# Patient Record
Sex: FEMALE | Race: WHITE | NOT HISPANIC OR LATINO | Employment: OTHER | URBAN - METROPOLITAN AREA
[De-identification: names, ages, dates, MRNs, and addresses within clinical notes are randomized per-mention and may not be internally consistent; named-entity substitution may affect disease eponyms.]

---

## 2017-03-22 PROBLEM — E78.5 HYPERLIPIDEMIA ASSOCIATED WITH TYPE 2 DIABETES MELLITUS: Status: ACTIVE | Noted: 2017-03-22

## 2017-03-22 PROBLEM — E11.69 HYPERLIPIDEMIA ASSOCIATED WITH TYPE 2 DIABETES MELLITUS: Status: ACTIVE | Noted: 2017-03-22

## 2017-03-22 PROBLEM — I15.2 HYPERTENSION ASSOCIATED WITH DIABETES: Status: ACTIVE | Noted: 2017-03-22

## 2017-03-22 PROBLEM — E03.9 HYPOTHYROIDISM: Status: ACTIVE | Noted: 2017-03-22

## 2017-03-22 PROBLEM — E11.59 HYPERTENSION ASSOCIATED WITH DIABETES: Status: ACTIVE | Noted: 2017-03-22

## 2017-03-22 PROBLEM — E53.8 B12 DEFICIENCY: Status: ACTIVE | Noted: 2017-03-22

## 2017-03-22 PROBLEM — E11.42 TYPE 2 DIABETES MELLITUS WITH DIABETIC POLYNEUROPATHY: Status: ACTIVE | Noted: 2017-03-22

## 2017-09-18 ENCOUNTER — TELEPHONE (OUTPATIENT)
Dept: RADIOLOGY | Facility: HOSPITAL | Age: 77
End: 2017-09-18

## 2017-09-19 ENCOUNTER — APPOINTMENT (OUTPATIENT)
Dept: RADIOLOGY | Facility: CLINIC | Age: 77
End: 2017-09-19
Attending: FAMILY MEDICINE
Payer: COMMERCIAL

## 2017-09-19 DIAGNOSIS — M81.0 POST-MENOPAUSAL OSTEOPOROSIS: ICD-10-CM

## 2017-09-19 PROCEDURE — 77080 DXA BONE DENSITY AXIAL: CPT | Mod: TC

## 2017-09-19 PROCEDURE — 77080 DXA BONE DENSITY AXIAL: CPT | Mod: 26,,, | Performed by: RADIOLOGY

## 2018-04-24 PROBLEM — M10.9 ACUTE GOUT: Status: ACTIVE | Noted: 2018-04-24

## 2018-06-04 PROBLEM — M51.36 DDD (DEGENERATIVE DISC DISEASE), LUMBAR: Status: ACTIVE | Noted: 2018-06-04

## 2018-06-04 PROBLEM — M51.369 DDD (DEGENERATIVE DISC DISEASE), LUMBAR: Status: ACTIVE | Noted: 2018-06-04

## 2018-06-04 PROBLEM — H35.3132 INTERMEDIATE STAGE NONEXUDATIVE AGE-RELATED MACULAR DEGENERATION OF BOTH EYES: Status: ACTIVE | Noted: 2018-06-04

## 2019-06-18 PROBLEM — Z88.8 ALLERGY TO STATIN MEDICATION: Status: ACTIVE | Noted: 2019-06-18

## 2021-08-05 ENCOUNTER — HOSPITAL ENCOUNTER (OUTPATIENT)
Dept: CARDIOLOGY | Facility: HOSPITAL | Age: 81
Discharge: HOME | End: 2021-08-05
Attending: FAMILY MEDICINE
Payer: COMMERCIAL

## 2021-08-05 ENCOUNTER — APPOINTMENT (OUTPATIENT)
Dept: RADIOLOGY | Facility: HOSPITAL | Age: 81
End: 2021-08-05
Attending: FAMILY MEDICINE
Payer: COMMERCIAL

## 2021-08-05 VITALS
HEIGHT: 63 IN | BODY MASS INDEX: 36.32 KG/M2 | WEIGHT: 205 LBS | SYSTOLIC BLOOD PRESSURE: 167 MMHG | DIASTOLIC BLOOD PRESSURE: 81 MMHG | HEART RATE: 78 BPM

## 2021-08-05 DIAGNOSIS — R01.1 HEART MURMUR: ICD-10-CM

## 2021-08-05 DIAGNOSIS — Z78.0 ASYMPTOMATIC MENOPAUSAL STATE: ICD-10-CM

## 2021-08-05 LAB
ASCENDING AORTA: 3.15 CM
AV INDEX (PROSTH): 0.58
AV MEAN GRADIENT: 11 MMHG
AV PEAK GRADIENT: 20 MMHG
AV VALVE AREA: 1.65 CM2
AV VELOCITY RATIO: 0.59
BSA FOR ECHO PROCEDURE: 2.03 M2
CV ECHO LV RWT: 0.52 CM
DOP CALC AO PEAK VEL: 2.25 M/S
DOP CALC AO VTI: 51.01 CM
DOP CALC LVOT AREA: 2.8 CM2
DOP CALC LVOT DIAMETER: 1.9 CM
DOP CALC LVOT PEAK VEL: 1.32 M/S
DOP CALC LVOT STROKE VOLUME: 83.94 CM3
DOP CALC RVOT PEAK VEL: 0.72 M/S
DOP CALC RVOT VTI: 15.54 CM
DOP CALCLVOT PEAK VEL VTI: 29.62 CM
E WAVE DECELERATION TIME: 234.27 MSEC
E/A RATIO: 0.74
E/E' RATIO: 7.91 M/S
ECHO LV POSTERIOR WALL: 1.1 CM (ref 0.6–1.1)
EJECTION FRACTION: 60 %
FRACTIONAL SHORTENING: 37 % (ref 28–44)
INTERVENTRICULAR SEPTUM: 1.13 CM (ref 0.6–1.1)
IVRT: 97.05 MSEC
LA MAJOR: 4.59 CM
LA MINOR: 4.81 CM
LA WIDTH: 3.12 CM
LEFT ATRIUM SIZE: 3.26 CM
LEFT ATRIUM VOLUME INDEX: 20.8 ML/M2
LEFT ATRIUM VOLUME: 40.61 CM3
LEFT INTERNAL DIMENSION IN SYSTOLE: 2.66 CM (ref 2.1–4)
LEFT VENTRICLE DIASTOLIC VOLUME INDEX: 40.28 ML/M2
LEFT VENTRICLE DIASTOLIC VOLUME: 78.55 ML
LEFT VENTRICLE MASS INDEX: 82 G/M2
LEFT VENTRICLE SYSTOLIC VOLUME INDEX: 13.3 ML/M2
LEFT VENTRICLE SYSTOLIC VOLUME: 25.94 ML
LEFT VENTRICULAR INTERNAL DIMENSION IN DIASTOLE: 4.2 CM (ref 3.5–6)
LEFT VENTRICULAR MASS: 160.14 G
LV LATERAL E/E' RATIO: 8.7 M/S
LV SEPTAL E/E' RATIO: 7.25 M/S
MV A" WAVE DURATION": 10.56 MSEC
MV PEAK A VEL: 1.17 M/S
MV PEAK E VEL: 0.87 M/S
MV STENOSIS PRESSURE HALF TIME: 67.94 MS
MV VALVE AREA P 1/2 METHOD: 3.24 CM2
PISA TR MAX VEL: 2.36 M/S
PULM VEIN S/D RATIO: 1.47
PV MEAN GRADIENT: 1 MMHG
PV PEAK D VEL: 0.43 M/S
PV PEAK S VEL: 0.63 M/S
PV PEAK VELOCITY: 1.01 CM/S
RA MAJOR: 5.19 CM
RA PRESSURE: 3 MMHG
RA WIDTH: 2.75 CM
RIGHT VENTRICULAR END-DIASTOLIC DIMENSION: 2.64 CM
SINUS: 2.23 CM
STJ: 1.71 CM
TDI LATERAL: 0.1 M/S
TDI SEPTAL: 0.12 M/S
TDI: 0.11 M/S
TR MAX PG: 22 MMHG
TV REST PULMONARY ARTERY PRESSURE: 25 MMHG

## 2021-08-05 PROCEDURE — 77080 DXA BONE DENSITY AXIAL: CPT | Mod: 26,,, | Performed by: RADIOLOGY

## 2021-08-05 PROCEDURE — 93306 ECHO (CUPID ONLY): ICD-10-PCS | Mod: 26,,, | Performed by: INTERNAL MEDICINE

## 2021-08-05 PROCEDURE — 93306 TTE W/DOPPLER COMPLETE: CPT

## 2021-08-05 PROCEDURE — 93306 TTE W/DOPPLER COMPLETE: CPT | Mod: 26,,, | Performed by: INTERNAL MEDICINE

## 2021-08-05 PROCEDURE — 77080 DEXA BONE DENSITY SPINE HIP: ICD-10-PCS | Mod: 26,,, | Performed by: RADIOLOGY

## 2021-08-05 PROCEDURE — 77080 DXA BONE DENSITY AXIAL: CPT | Mod: TC

## 2023-07-11 ENCOUNTER — OFFICE VISIT (OUTPATIENT)
Dept: OPHTHALMOLOGY | Facility: CLINIC | Age: 83
End: 2023-07-11
Payer: MEDICARE

## 2023-07-11 ENCOUNTER — HOSPITAL ENCOUNTER (OUTPATIENT)
Dept: CARDIOLOGY | Facility: HOSPITAL | Age: 83
Discharge: HOME | End: 2023-07-11
Attending: INTERNAL MEDICINE
Payer: MEDICARE

## 2023-07-11 ENCOUNTER — TELEPHONE (OUTPATIENT)
Dept: CARDIOLOGY | Facility: CLINIC | Age: 83
End: 2023-07-11
Payer: MEDICARE

## 2023-07-11 ENCOUNTER — OFFICE VISIT (OUTPATIENT)
Dept: CARDIOLOGY | Facility: CLINIC | Age: 83
End: 2023-07-11
Payer: MEDICARE

## 2023-07-11 VITALS
OXYGEN SATURATION: 96 % | BODY MASS INDEX: 34.2 KG/M2 | WEIGHT: 193 LBS | DIASTOLIC BLOOD PRESSURE: 74 MMHG | HEART RATE: 64 BPM | HEIGHT: 63 IN | SYSTOLIC BLOOD PRESSURE: 122 MMHG

## 2023-07-11 DIAGNOSIS — E11.59 HYPERTENSION ASSOCIATED WITH DIABETES: ICD-10-CM

## 2023-07-11 DIAGNOSIS — E11.42 TYPE 2 DIABETES MELLITUS WITH DIABETIC POLYNEUROPATHY, WITHOUT LONG-TERM CURRENT USE OF INSULIN: ICD-10-CM

## 2023-07-11 DIAGNOSIS — I35.0 NONRHEUMATIC AORTIC VALVE STENOSIS: ICD-10-CM

## 2023-07-11 DIAGNOSIS — E78.5 HYPERLIPIDEMIA ASSOCIATED WITH TYPE 2 DIABETES MELLITUS: ICD-10-CM

## 2023-07-11 DIAGNOSIS — I15.2 HYPERTENSION ASSOCIATED WITH DIABETES: ICD-10-CM

## 2023-07-11 DIAGNOSIS — E11.9 CONTROLLED TYPE 2 DIABETES MELLITUS WITHOUT COMPLICATION, WITHOUT LONG-TERM CURRENT USE OF INSULIN: Primary | ICD-10-CM

## 2023-07-11 DIAGNOSIS — I15.2 HYPERTENSION ASSOCIATED WITH DIABETES: Primary | ICD-10-CM

## 2023-07-11 DIAGNOSIS — R94.31 ABNORMAL ECG: ICD-10-CM

## 2023-07-11 DIAGNOSIS — Z72.0 TOBACCO ABUSE: ICD-10-CM

## 2023-07-11 DIAGNOSIS — Z88.8 ALLERGY TO STATIN MEDICATION: ICD-10-CM

## 2023-07-11 DIAGNOSIS — I51.89 DIASTOLIC DYSFUNCTION: ICD-10-CM

## 2023-07-11 DIAGNOSIS — E66.09 CLASS 1 OBESITY DUE TO EXCESS CALORIES WITH SERIOUS COMORBIDITY AND BODY MASS INDEX (BMI) OF 34.0 TO 34.9 IN ADULT: ICD-10-CM

## 2023-07-11 DIAGNOSIS — Z98.890 HISTORY OF REPAIR OF RETINAL TEAR BY LASER PHOTOCOAGULATION: ICD-10-CM

## 2023-07-11 DIAGNOSIS — Z96.1 PSEUDOPHAKIA: ICD-10-CM

## 2023-07-11 DIAGNOSIS — E11.59 HYPERTENSION ASSOCIATED WITH DIABETES: Primary | ICD-10-CM

## 2023-07-11 DIAGNOSIS — E11.69 HYPERLIPIDEMIA ASSOCIATED WITH TYPE 2 DIABETES MELLITUS: ICD-10-CM

## 2023-07-11 DIAGNOSIS — H35.3132 INTERMEDIATE STAGE NONEXUDATIVE AGE-RELATED MACULAR DEGENERATION OF BOTH EYES: ICD-10-CM

## 2023-07-11 PROBLEM — E66.811 CLASS 1 OBESITY DUE TO EXCESS CALORIES WITH SERIOUS COMORBIDITY AND BODY MASS INDEX (BMI) OF 34.0 TO 34.9 IN ADULT: Status: ACTIVE | Noted: 2023-07-11

## 2023-07-11 PROCEDURE — 99999 PR PBB SHADOW E&M-EST. PATIENT-LVL II: ICD-10-PCS | Mod: PBBFAC,HCWC,, | Performed by: OPHTHALMOLOGY

## 2023-07-11 PROCEDURE — 99204 OFFICE O/P NEW MOD 45 MIN: CPT | Mod: HCWC,S$GLB,, | Performed by: INTERNAL MEDICINE

## 2023-07-11 PROCEDURE — 99999 PR PBB SHADOW E&M-EST. PATIENT-LVL III: CPT | Mod: PBBFAC,HCWC,, | Performed by: INTERNAL MEDICINE

## 2023-07-11 PROCEDURE — 93010 EKG 12-LEAD: ICD-10-PCS | Mod: HCWC,,, | Performed by: INTERNAL MEDICINE

## 2023-07-11 PROCEDURE — 92134 CPTRZ OPH DX IMG PST SGM RTA: CPT | Mod: HCWC,S$GLB,, | Performed by: OPHTHALMOLOGY

## 2023-07-11 PROCEDURE — 1160F PR REVIEW ALL MEDS BY PRESCRIBER/CLIN PHARMACIST DOCUMENTED: ICD-10-PCS | Mod: HCWC,CPTII,S$GLB, | Performed by: INTERNAL MEDICINE

## 2023-07-11 PROCEDURE — 1159F MED LIST DOCD IN RCRD: CPT | Mod: HCWC,CPTII,S$GLB, | Performed by: INTERNAL MEDICINE

## 2023-07-11 PROCEDURE — 3078F PR MOST RECENT DIASTOLIC BLOOD PRESSURE < 80 MM HG: ICD-10-PCS | Mod: HCWC,CPTII,S$GLB, | Performed by: INTERNAL MEDICINE

## 2023-07-11 PROCEDURE — 1159F PR MEDICATION LIST DOCUMENTED IN MEDICAL RECORD: ICD-10-PCS | Mod: HCWC,CPTII,S$GLB, | Performed by: INTERNAL MEDICINE

## 2023-07-11 PROCEDURE — 3078F DIAST BP <80 MM HG: CPT | Mod: HCWC,CPTII,S$GLB, | Performed by: INTERNAL MEDICINE

## 2023-07-11 PROCEDURE — 92004 COMPRE OPH EXAM NEW PT 1/>: CPT | Mod: HCWC,S$GLB,, | Performed by: OPHTHALMOLOGY

## 2023-07-11 PROCEDURE — 3074F SYST BP LT 130 MM HG: CPT | Mod: HCWC,CPTII,S$GLB, | Performed by: INTERNAL MEDICINE

## 2023-07-11 PROCEDURE — 93010 ELECTROCARDIOGRAM REPORT: CPT | Mod: HCWC,,, | Performed by: INTERNAL MEDICINE

## 2023-07-11 PROCEDURE — 3074F PR MOST RECENT SYSTOLIC BLOOD PRESSURE < 130 MM HG: ICD-10-PCS | Mod: HCWC,CPTII,S$GLB, | Performed by: INTERNAL MEDICINE

## 2023-07-11 PROCEDURE — 99204 PR OFFICE/OUTPT VISIT, NEW, LEVL IV, 45-59 MIN: ICD-10-PCS | Mod: HCWC,S$GLB,, | Performed by: INTERNAL MEDICINE

## 2023-07-11 PROCEDURE — 99999 PR PBB SHADOW E&M-EST. PATIENT-LVL II: CPT | Mod: PBBFAC,HCWC,, | Performed by: OPHTHALMOLOGY

## 2023-07-11 PROCEDURE — 93005 ELECTROCARDIOGRAM TRACING: CPT | Mod: HCWC

## 2023-07-11 PROCEDURE — 99999 PR PBB SHADOW E&M-EST. PATIENT-LVL III: ICD-10-PCS | Mod: PBBFAC,HCWC,, | Performed by: INTERNAL MEDICINE

## 2023-07-11 PROCEDURE — 92004 PR EYE EXAM, NEW PATIENT,COMPREHESV: ICD-10-PCS | Mod: HCWC,S$GLB,, | Performed by: OPHTHALMOLOGY

## 2023-07-11 PROCEDURE — 92134 POSTERIOR SEGMENT OCT RETINA (OCULAR COHERENCE TOMOGRAPHY)-BOTH EYES: ICD-10-PCS | Mod: HCWC,S$GLB,, | Performed by: OPHTHALMOLOGY

## 2023-07-11 PROCEDURE — 1160F RVW MEDS BY RX/DR IN RCRD: CPT | Mod: HCWC,CPTII,S$GLB, | Performed by: INTERNAL MEDICINE

## 2023-07-11 NOTE — PROGRESS NOTES
===============================  Date today is 7/11/2023  Mandie Wu is a 82 y.o. female  Last visit [facility]: :Visit date not found   Last visit eye dept. Visit date not found    Uncorrected distance visual acuity was 20/60 in the right eye and CF in the left eye.  Tonometry       Tonometry (Applanation, 4:15 PM)         Right Left    Pressure 20 18                  Not recorded       Not recorded       Not recorded       Chief Complaint   Patient presents with    Diabetic Eye Exam     Pt states she had retinal tear repair OS    Macular Degeneration     HPI     Diabetic Eye Exam     Additional comments: Pt states she had retinal tear repair OS          Last edited by MARY Dixon MD on 7/11/2023  4:11 PM.      Problem List Items Addressed This Visit          Eye/Vision problems    Type 2 diabetes mellitus with diabetic polyneuropathy    Relevant Orders    Posterior Segment OCT Retina-Both eyes (Completed)    Intermediate stage nonexudative age-related macular degeneration of both eyes    Overview     Dr paiz         Relevant Orders    Posterior Segment OCT Retina-Both eyes (Completed)     Other Visit Diagnoses       Controlled type 2 diabetes mellitus without complication, without long-term current use of insulin    -  Primary    Pseudophakia        History of repair of retinal tear by laser photocoagulation        Relevant Orders    Posterior Segment OCT Retina-Both eyes (Completed)          Instructed to call 24/7 for any worsening of vision, visual distortion or pain.  Check OU independently daily.    Gave my office and personal cell phone number.  ________________  7/11/2023 today  Mandie Wu      DM no retinopathy  OCT looks good  Hx retinal tear repair OS  RPE mottling OU  PCIOL OU  Drusen OU  MR-NI  Pt likes +4.50 readers  Will refer to [facility] for low vision workup    RTC 1 year  Instructed to call 24/7 for any worsening of vision or symptoms. Check OU daily.   Gave my office and cell  phone number.    =============================

## 2023-07-11 NOTE — PROGRESS NOTES
Subjective:   Patient ID:  Mandie Wu is a 82 y.o. female who presents for evaluation of Hypertension and Valvular Heart Disease    PT REFERRED BY DR. LARRY MCQUEEN      HPI  Pt presents for eval.  She has HTN, hyperlipidemia, DM, DD, AS, macular degeneration.  Smoker.  Statin allergy.  Used to [facility].  Echo Aug 2021: normal LVEF, grade I DD, mild AS, mild TR, PAP 25 mmHg.  No angina.  No CHF sxs.  No syncope.  No palpitations.  Ecg today 7/11/23 NSR, low voltage QRS, low R waves precordial leads.  On Repatha.  HGAIC at goal.        Past Medical History:   Diagnosis Date    Abnormal ECG 7/11/2023    Acute gout 4/24/2018    Allergic rhinitis     Class 1 obesity due to excess calories with serious comorbidity and body mass index (BMI) of 34.0 to 34.9 in adult 7/11/2023    Diabetes mellitus     Diastolic dysfunction 7/11/2023    Hyperlipidemia     Hyperlipidemia associated with type 2 diabetes mellitus 3/22/2017    Hypertension     Hypertension associated with diabetes 3/22/2017    Hypothyroidism     Nonrheumatic aortic valve stenosis 7/11/2023    Tobacco abuse 7/11/2023    Type 2 diabetes mellitus with diabetic polyneuropathy 3/22/2017    Vitamin B12 deficiency        Current Outpatient Medications:     amLODIPine (NORVASC) 5 MG tablet, Take 1 tablet (5 mg total) by mouth once daily., Disp: 90 tablet, Rfl: 3    aspirin (ECOTRIN) 81 MG EC tablet, Take 1 tablet (81 mg total) by mouth once daily., Disp: , Rfl: 0    furosemide (LASIX) 40 MG tablet, Take 1 tablet (40 mg total) by mouth once daily., Disp: 90 tablet, Rfl: 3    gabapentin (NEURONTIN) 300 MG capsule, Take 2 capsules (600 mg total) by mouth every evening., Disp: 180 capsule, Rfl: 1    HYDROcodone-acetaminophen (NORCO)  mg per tablet, Take 1 tablet by mouth 3 (three) times daily., Disp: , Rfl:     indomethacin (INDOCIN) 50 MG capsule, Take 1 capsule (50 mg total) by mouth 3 (three) times daily as needed (gout)., Disp: 30 capsule, Rfl: 0     "levothyroxine (SYNTHROID) 100 MCG tablet, Take 1 tablet (100 mcg total) by mouth once daily., Disp: 90 tablet, Rfl: 3    losartan-hydrochlorothiazide 100-25 mg (HYZAAR) 100-25 mg per tablet, Take 1 tablet by mouth once daily., Disp: 90 tablet, Rfl: 3    metFORMIN (GLUCOPHAGE-XR) 500 MG ER 24hr tablet, Take 2 tablets (1,000 mg total) by mouth once daily., Disp: 180 tablet, Rfl: 3    metoprolol succinate (TOPROL XL) 50 MG 24 hr tablet, Take 1 tablet (50 mg total) by mouth once daily., Disp: 90 tablet, Rfl: 3    ondansetron (ZOFRAN-ODT) 4 MG TbDL, Take 1 tablet (4 mg total) by mouth every 8 (eight) hours as needed (nausea/vomiting)., Disp: 20 tablet, Rfl: 0    potassium chloride SA (K-DUR,KLOR-CON) 20 MEQ tablet, Take 1 tablet (20 mEq total) by mouth once daily., Disp: 90 tablet, Rfl: 3    REPATHA SURECLICK 140 mg/mL PnIj, 140 mg., Disp: , Rfl: 11      Review of Systems   Constitutional: Negative.   HENT: Negative.     Eyes:  Positive for vision loss in left eye.   Cardiovascular: Negative.    Respiratory: Negative.     Endocrine: Negative.    Hematologic/Lymphatic: Negative.    Skin: Negative.    Musculoskeletal:  Positive for arthritis, back pain and joint pain.   Gastrointestinal: Negative.    Genitourinary: Negative.    Neurological: Negative.    Psychiatric/Behavioral: Negative.     Allergic/Immunologic: Negative.        /74   Pulse 64   Ht 5' 3" (1.6 m)   Wt 87.5 kg (193 lb)   SpO2 96%   BMI 34.19 kg/m²   Wt Readings from Last 3 Encounters:   07/11/23 87.5 kg (193 lb)   02/28/23 87.5 kg (193 lb)   08/30/22 90.3 kg (199 lb)     Temp Readings from Last 3 Encounters:   No data found for Temp     BP Readings from Last 3 Encounters:   07/11/23 122/74   02/28/23 139/72   08/30/22 130/72     Pulse Readings from Last 3 Encounters:   07/11/23 64   02/28/23 66   08/30/22 67       Objective:   Physical Exam  Vitals and nursing note reviewed.   Constitutional:       General: She is not in acute distress.     " Appearance: Normal appearance. She is well-developed. She is obese. She is not ill-appearing or diaphoretic.   HENT:      Head: Normocephalic.   Neck:      Thyroid: No thyromegaly.      Vascular: Normal carotid pulses. No carotid bruit, hepatojugular reflux or JVD.   Cardiovascular:      Rate and Rhythm: Normal rate and regular rhythm.      Chest Wall: PMI is not displaced.      Pulses: Normal pulses.           Radial pulses are 2+ on the right side and 2+ on the left side.      Heart sounds: Normal heart sounds, S1 normal and S2 normal. No murmur heard.    No friction rub. No gallop.   Pulmonary:      Effort: Pulmonary effort is normal.      Breath sounds: Normal breath sounds. No wheezing or rales.   Abdominal:      General: Bowel sounds are normal. There is no abdominal bruit.      Palpations: Abdomen is soft. There is no hepatomegaly, splenomegaly or mass.      Tenderness: There is no abdominal tenderness.   Musculoskeletal:      Cervical back: Neck supple.   Lymphadenopathy:      Cervical: No cervical adenopathy.   Skin:     General: Skin is warm.   Neurological:      Mental Status: She is alert and oriented to person, place, and time.   Psychiatric:         Behavior: Behavior normal. Behavior is cooperative.       I have reviewed all pertinent labs and cardiac studies.        Chemistry        Component Value Date/Time     02/28/2023 1252    K 4.4 02/28/2023 1252    CL 98 02/28/2023 1252    CO2 25 02/28/2023 1252    BUN 12 02/28/2023 1252    CREATININE 0.67 02/28/2023 1252    GLU 94 02/28/2023 1252        Component Value Date/Time    CALCIUM 10.3 02/28/2023 1252    ALKPHOS 95 06/22/2020 0937    AST 32 02/28/2023 1252    ALT 24 02/28/2023 1252    BILITOT 0.5 02/28/2023 1252    EGFRNONAA 82 02/25/2022 1001        Lab Results   Component Value Date    WBC 7.3 06/22/2020    HGB 13.5 06/22/2020    HCT 40.7 06/22/2020    MCV 89 06/22/2020     06/22/2020       Lab Results   Component Value Date    HGBA1C  6.0 (H) 02/28/2023     Lab Results   Component Value Date    TSH 0.734 02/28/2023     Lab Results   Component Value Date    CHOL 192 02/28/2023    CHOL 175 02/25/2022    CHOL 208 (H) 07/27/2021     Lab Results   Component Value Date    HDL 49 02/28/2023    HDL 45 02/25/2022    HDL 46 07/27/2021     Lab Results   Component Value Date    LDLCALC 98 02/28/2023    LDLCALC 84 02/25/2022    LDLCALC 116 (H) 07/27/2021     No results found for: DLDL  Lab Results   Component Value Date    TRIG 269 (H) 02/28/2023    TRIG 277 (H) 02/25/2022    TRIG 262 (H) 07/27/2021       Results for orders placed during the hospital encounter of 08/05/21    Echo    Interpretation Summary  · The left ventricle is normal in size with concentric remodeling and normal systolic function.  · The estimated ejection fraction is 60%.  · Grade I left ventricular diastolic dysfunction.  · Normal right ventricular size with normal right ventricular systolic function.  · There is mild aortic valve stenosis.  · Aortic valve area is 1.65 cm2; peak velocity is 2.25 m/s; mean gradient is 11 mmHg.  · Mild tricuspid regurgitation.  · Normal central venous pressure (3 mmHg).  · The estimated PA systolic pressure is 25 mmHg.        Assessment:      1. Hypertension associated with diabetes    2. Allergy to statin medication    3. Hyperlipidemia associated with type 2 diabetes mellitus    4. Nonrheumatic aortic valve stenosis    5. Diastolic dysfunction    6. Abnormal ECG    7. Tobacco abuse    8. Class 1 obesity due to excess calories with serious comorbidity and body mass index (BMI) of 34.0 to 34.9 in adult        Plan:       Stable cardiovascular conditions at present time on current medical treatment.  Reviewed all tests and above medical conditions with patient in detail and formulated treatment plan.  Continue optimal medical treatment for cardiovascular conditions.  Cardiac low salt diet advised.  Aortic stenosis: Stable. Asx. Reevaluate with  echocardiogram.  Diastolic Dysfunction: Stable. Asx. Discussed w pt. HTN control.  Daily exercise encouraged, with the goal 30 +  minutes aerobic exercise as tolerated.  Obesity: weight loss needed. Maintaining healthy weight and weight loss goals (if needed) were discussed in clinic.  HTN: Need for BP control and HTN goals (if needed) were discussed and tx plan formulated.  Goal < 130/80.  Continue current HTN meds.  Lipids:Importance of optimal lipid control were discussed in detail as well as possible pharmacologic and lifestyle changes that may be needed.  Continue Repatha.  Abnl ecg: monitor.  Tobacco abuse: smoking cessation advised.  DM: optimal HGAIC control advised.    PHONE REVIEW.    F/u in 6 months.      I have reviewed all pertinent labs and cardiac studies independently. Plans and recommendations have been formulated under my direct supervision. All questions answered and patient voiced understanding.

## 2023-07-14 ENCOUNTER — TELEPHONE (OUTPATIENT)
Dept: CARDIOLOGY | Facility: HOSPITAL | Age: 83
End: 2023-07-14
Payer: MEDICARE

## 2023-07-14 NOTE — TELEPHONE ENCOUNTER
----- Message from Naomi Davis sent at 7/14/2023 12:38 PM CDT -----  Contact: Mandie Lennon is calling in regards to appt scheduled on 07/17. Pt would like to reschedule appt to due to not being able to drive on 08/07 if possible around 1/2 PM. Please call back at [phone]          Thanks  TAMMY

## 2023-07-14 NOTE — TELEPHONE ENCOUNTER
----- Message from Naomi Davis sent at 7/14/2023 12:38 PM CDT -----  Contact: Mandie Lennon is calling in regards to appt scheduled on 07/17. Pt would like to reschedule appt to due to not being able to drive on 08/07 if possible around 1/2 PM. Please call back at [phone]          Thanks  TAMMY

## 2023-08-07 ENCOUNTER — HOSPITAL ENCOUNTER (OUTPATIENT)
Dept: CARDIOLOGY | Facility: HOSPITAL | Age: 83
Discharge: HOME | End: 2023-08-07
Attending: INTERNAL MEDICINE
Payer: MEDICARE

## 2023-08-07 VITALS
BODY MASS INDEX: 34.2 KG/M2 | SYSTOLIC BLOOD PRESSURE: 122 MMHG | DIASTOLIC BLOOD PRESSURE: 74 MMHG | HEIGHT: 63 IN | WEIGHT: 193 LBS

## 2023-08-07 DIAGNOSIS — I15.2 HYPERTENSION ASSOCIATED WITH DIABETES: ICD-10-CM

## 2023-08-07 DIAGNOSIS — I51.89 DIASTOLIC DYSFUNCTION: ICD-10-CM

## 2023-08-07 DIAGNOSIS — R94.31 ABNORMAL ECG: ICD-10-CM

## 2023-08-07 DIAGNOSIS — E11.59 HYPERTENSION ASSOCIATED WITH DIABETES: ICD-10-CM

## 2023-08-07 DIAGNOSIS — I35.0 NONRHEUMATIC AORTIC VALVE STENOSIS: ICD-10-CM

## 2023-08-07 PROCEDURE — 93306 TTE W/DOPPLER COMPLETE: CPT | Mod: 26,HCWC,, | Performed by: STUDENT IN AN ORGANIZED HEALTH CARE EDUCATION/TRAINING PROGRAM

## 2023-08-07 PROCEDURE — 93306 TTE W/DOPPLER COMPLETE: CPT | Mod: HCWC

## 2023-08-07 PROCEDURE — 93306 ECHO (CUPID ONLY): ICD-10-PCS | Mod: 26,HCWC,, | Performed by: STUDENT IN AN ORGANIZED HEALTH CARE EDUCATION/TRAINING PROGRAM

## 2023-08-08 LAB
AORTIC ROOT ANNULUS: 2.91 CM
ASCENDING AORTA: 3.31 CM
AV INDEX (PROSTH): 0.62
AV MEAN GRADIENT: 5 MMHG
AV PEAK GRADIENT: 10 MMHG
AV VALVE AREA BY VELOCITY RATIO: 1.74 CM²
AV VALVE AREA: 1.72 CM²
AV VELOCITY RATIO: 0.63
BSA FOR ECHO PROCEDURE: 1.97 M2
CV ECHO LV RWT: 0.54 CM
DOP CALC AO PEAK VEL: 1.61 M/S
DOP CALC AO VTI: 40.4 CM
DOP CALC LVOT AREA: 2.8 CM2
DOP CALC LVOT DIAMETER: 1.88 CM
DOP CALC LVOT PEAK VEL: 1.01 M/S
DOP CALC LVOT STROKE VOLUME: 69.64 CM3
DOP CALC RVOT PEAK VEL: 0.79 M/S
DOP CALC RVOT VTI: 19.7 CM
DOP CALCLVOT PEAK VEL VTI: 25.1 CM
E WAVE DECELERATION TIME: 251.21 MSEC
E/A RATIO: 0.68
E/E' RATIO: 9.47 M/S
ECHO LV POSTERIOR WALL: 1.01 CM (ref 0.6–1.1)
EJECTION FRACTION: 65 %
FRACTIONAL SHORTENING: 36 % (ref 28–44)
INTERVENTRICULAR SEPTUM: 1.11 CM (ref 0.6–1.1)
IVC DIAMETER: 1.28 CM
IVRT: 117.98 MSEC
LA MAJOR: 5.54 CM
LA MINOR: 5.36 CM
LA WIDTH: 3.2 CM
LEFT ATRIUM SIZE: 3.57 CM
LEFT ATRIUM VOLUME INDEX MOD: 13.7 ML/M2
LEFT ATRIUM VOLUME INDEX: 27.8 ML/M2
LEFT ATRIUM VOLUME MOD: 26.06 CM3
LEFT ATRIUM VOLUME: 52.91 CM3
LEFT INTERNAL DIMENSION IN SYSTOLE: 2.38 CM (ref 2.1–4)
LEFT VENTRICLE DIASTOLIC VOLUME INDEX: 30.97 ML/M2
LEFT VENTRICLE DIASTOLIC VOLUME: 58.85 ML
LEFT VENTRICLE MASS INDEX: 65 G/M2
LEFT VENTRICLE SYSTOLIC VOLUME INDEX: 10.4 ML/M2
LEFT VENTRICLE SYSTOLIC VOLUME: 19.72 ML
LEFT VENTRICULAR INTERNAL DIMENSION IN DIASTOLE: 3.72 CM (ref 3.5–6)
LEFT VENTRICULAR MASS: 123.48 G
LV LATERAL E/E' RATIO: 8.88 M/S
LV SEPTAL E/E' RATIO: 10.14 M/S
LVOT MG: 2.23 MMHG
LVOT MV: 0.71 CM/S
MV PEAK A VEL: 1.04 M/S
MV PEAK E VEL: 0.71 M/S
PISA TR MAX VEL: 2.23 M/S
PULM VEIN S/D RATIO: 1.76
PV MEAN GRADIENT: 1 MMHG
PV MV: 0.65 M/S
PV PEAK D VEL: 0.34 M/S
PV PEAK GRADIENT: 3 MMHG
PV PEAK S VEL: 0.6 M/S
PV PEAK VELOCITY: 0.84 M/S
RA MAJOR: 5.44 CM
RA PRESSURE ESTIMATED: 3 MMHG
RA WIDTH: 3.39 CM
RIGHT VENTRICULAR END-DIASTOLIC DIMENSION: 2.92 CM
RV TB RVSP: 5 MMHG
SINUS: 3.05 CM
STJ: 3.14 CM
TDI LATERAL: 0.08 M/S
TDI SEPTAL: 0.07 M/S
TDI: 0.08 M/S
TR MAX PG: 20 MMHG
TRICUSPID ANNULAR PLANE SYSTOLIC EXCURSION: 2.08 CM
TV PEAK E VEL: 0.1 M/S
TV REST PULMONARY ARTERY PRESSURE: 23 MMHG
Z-SCORE OF LEFT VENTRICULAR DIMENSION IN END DIASTOLE: -3.6
Z-SCORE OF LEFT VENTRICULAR DIMENSION IN END SYSTOLE: -2.55

## 2023-08-09 ENCOUNTER — TELEPHONE (OUTPATIENT)
Dept: CARDIOLOGY | Facility: CLINIC | Age: 83
End: 2023-08-09
Payer: MEDICARE

## 2023-08-09 NOTE — TELEPHONE ENCOUNTER
Gee Schmitt, MD [facility] Staff  Please contact the patient and let them know that their echo results were stable and do not require any change in treatment.     Continue current meds and f/u next scheduled appt.     Dr Schmitt     Called patient to advise per Dr. Schmitt     Spoke to patient verbalized understanding

## 2023-08-09 NOTE — PROGRESS NOTES
Please contact the patient and let them know that their echo results were stable and do not require any change in treatment.    Continue current meds and f/u next scheduled appt.    Dr Schmitt

## 2024-04-05 ENCOUNTER — PATIENT MESSAGE (OUTPATIENT)
Dept: CARDIOLOGY | Facility: CLINIC | Age: 84
End: 2024-04-05
Payer: COMMERCIAL

## 2024-04-22 ENCOUNTER — OFFICE VISIT (OUTPATIENT)
Dept: CARDIOLOGY | Facility: CLINIC | Age: 84
End: 2024-04-22
Payer: COMMERCIAL

## 2024-04-22 VITALS
OXYGEN SATURATION: 96 % | DIASTOLIC BLOOD PRESSURE: 78 MMHG | SYSTOLIC BLOOD PRESSURE: 140 MMHG | HEART RATE: 77 BPM | WEIGHT: 194.69 LBS | BODY MASS INDEX: 34.48 KG/M2

## 2024-04-22 DIAGNOSIS — Z72.0 TOBACCO ABUSE: ICD-10-CM

## 2024-04-22 DIAGNOSIS — I51.89 DIASTOLIC DYSFUNCTION: ICD-10-CM

## 2024-04-22 DIAGNOSIS — I15.2 HYPERTENSION ASSOCIATED WITH DIABETES: Primary | ICD-10-CM

## 2024-04-22 DIAGNOSIS — E03.9 HYPOTHYROIDISM, UNSPECIFIED TYPE: ICD-10-CM

## 2024-04-22 DIAGNOSIS — I35.0 NONRHEUMATIC AORTIC VALVE STENOSIS: ICD-10-CM

## 2024-04-22 DIAGNOSIS — E11.59 HYPERTENSION ASSOCIATED WITH DIABETES: Primary | ICD-10-CM

## 2024-04-22 DIAGNOSIS — Z88.8 ALLERGY TO STATIN MEDICATION: ICD-10-CM

## 2024-04-22 DIAGNOSIS — E66.09 CLASS 1 OBESITY DUE TO EXCESS CALORIES WITH SERIOUS COMORBIDITY AND BODY MASS INDEX (BMI) OF 34.0 TO 34.9 IN ADULT: ICD-10-CM

## 2024-04-22 DIAGNOSIS — E78.5 HYPERLIPIDEMIA ASSOCIATED WITH TYPE 2 DIABETES MELLITUS: ICD-10-CM

## 2024-04-22 DIAGNOSIS — E11.69 HYPERLIPIDEMIA ASSOCIATED WITH TYPE 2 DIABETES MELLITUS: ICD-10-CM

## 2024-04-22 DIAGNOSIS — E11.42 TYPE 2 DIABETES MELLITUS WITH DIABETIC POLYNEUROPATHY, WITHOUT LONG-TERM CURRENT USE OF INSULIN: ICD-10-CM

## 2024-04-22 PROCEDURE — 1101F PT FALLS ASSESS-DOCD LE1/YR: CPT | Mod: HCWC,CPTII,S$GLB, | Performed by: NURSE PRACTITIONER

## 2024-04-22 PROCEDURE — 3288F FALL RISK ASSESSMENT DOCD: CPT | Mod: HCWC,CPTII,S$GLB, | Performed by: NURSE PRACTITIONER

## 2024-04-22 PROCEDURE — 3077F SYST BP >= 140 MM HG: CPT | Mod: HCWC,CPTII,S$GLB, | Performed by: NURSE PRACTITIONER

## 2024-04-22 PROCEDURE — 1159F MED LIST DOCD IN RCRD: CPT | Mod: HCWC,CPTII,S$GLB, | Performed by: NURSE PRACTITIONER

## 2024-04-22 PROCEDURE — 99999 PR PBB SHADOW E&M-EST. PATIENT-LVL III: CPT | Mod: PBBFAC,HCWC,, | Performed by: NURSE PRACTITIONER

## 2024-04-22 PROCEDURE — 3072F LOW RISK FOR RETINOPATHY: CPT | Mod: HCWC,CPTII,S$GLB, | Performed by: NURSE PRACTITIONER

## 2024-04-22 PROCEDURE — 99214 OFFICE O/P EST MOD 30 MIN: CPT | Mod: HCWC,S$GLB,, | Performed by: NURSE PRACTITIONER

## 2024-04-22 PROCEDURE — 3078F DIAST BP <80 MM HG: CPT | Mod: HCWC,CPTII,S$GLB, | Performed by: NURSE PRACTITIONER

## 2024-04-22 RX ORDER — EVOLOCUMAB 140 MG/ML
140 INJECTION, SOLUTION SUBCUTANEOUS
Qty: 2 ML | Refills: 11 | Status: SHIPPED | OUTPATIENT
Start: 2024-04-22

## 2024-04-22 NOTE — PROGRESS NOTES
Subjective:   Patient ID:  Mandie Wu is a 83 y.o. female who presents for evaluation of No chief complaint on file.      HPI    Mandie Wu is a 83 year old female who presents to clinic for med refills.    His current medical conditions include HTN, HLP, Hypothyroidism, Obesity, DM type II, Vit B 12 deficiency, DD.     Denies any regular exercise program.     BP slightly elevated today in office.     Needs refill on Repatha today.    Denies chest pain or anginal equivalents. No shortness of breath, AMARO or palpitations. Denies orthopnea, PND or abdominal bloating. Reports regular walking without any issues lately. NO leg swelling or claudications. No recent falls, syncope or near syncopal events. Reports compliance with medications and dietary restrictions. NO CNS complaints to suggest TIA or CVA today. No signs of abnormal bleeding on ASA daily.       Past Medical History:   Diagnosis Date    Abnormal ECG 7/11/2023    Acute gout 4/24/2018    Allergic rhinitis     Class 1 obesity due to excess calories with serious comorbidity and body mass index (BMI) of 34.0 to 34.9 in adult 7/11/2023    Diabetes mellitus     Diastolic dysfunction 7/11/2023    Hyperlipidemia     Hyperlipidemia associated with type 2 diabetes mellitus 3/22/2017    Hypertension     Hypertension associated with diabetes 3/22/2017    Hypothyroidism     Nonrheumatic aortic valve stenosis 7/11/2023    Tobacco abuse 7/11/2023    Type 2 diabetes mellitus with diabetic polyneuropathy 3/22/2017    Vitamin B12 deficiency        Past Surgical History:   Procedure Laterality Date    CARPAL TUNNEL RELEASE      CATARACT EXTRACTION Bilateral     JOINT REPLACEMENT      partial histerectomy      TONSILLECTOMY      TOTAL KNEE ARTHROPLASTY Bilateral     TRIGGER FINGER RELEASE         Social History     Tobacco Use    Smoking status: Former    Smokeless tobacco: Never   Substance Use Topics    Alcohol use: No       No family history on file.    Wt Readings  from Last 3 Encounters:   04/22/24 88.3 kg (194 lb 10.7 oz)   02/21/24 87.1 kg (192 lb)   08/21/23 85.3 kg (188 lb)     Temp Readings from Last 3 Encounters:   No data found for Temp     BP Readings from Last 3 Encounters:   04/22/24 (!) 140/78   02/26/24 127/70   02/21/24 (!) 157/77     Pulse Readings from Last 3 Encounters:   04/22/24 77   02/21/24 88   08/21/23 61       Current Outpatient Medications on File Prior to Visit   Medication Sig Dispense Refill    amLODIPine (NORVASC) 5 MG tablet Take 1 tablet (5 mg total) by mouth once daily. 90 tablet 3    furosemide (LASIX) 40 MG tablet Take 1 tablet (40 mg total) by mouth once daily. 90 tablet 3    HYDROcodone-acetaminophen (NORCO)  mg per tablet Take 1 tablet by mouth 3 (three) times daily.      indomethacin (INDOCIN) 50 MG capsule TAKE 1 CAPSULE BY MOUTH THREE TIMES DAILY AS NEEDED FOR GOUT 30 capsule 0    levothyroxine (SYNTHROID) 100 MCG tablet Take 1 tablet (100 mcg total) by mouth once daily. 90 tablet 3    losartan-hydrochlorothiazide 100-25 mg (HYZAAR) 100-25 mg per tablet Take 1 tablet by mouth once daily. 90 tablet 3    metFORMIN (GLUCOPHAGE-XR) 500 MG ER 24hr tablet Take 2 tablets (1,000 mg total) by mouth once daily. 180 tablet 3    metoprolol succinate (TOPROL XL) 50 MG 24 hr tablet Take 1 tablet (50 mg total) by mouth once daily. 90 tablet 3    nystatin (MYCOSTATIN) powder Apply topically 4 (four) times daily. 60 g 1    potassium chloride SA (K-DUR,KLOR-CON) 20 MEQ tablet Take 1 tablet (20 mEq total) by mouth once daily. 90 tablet 3    [DISCONTINUED] REPATHA SURECLICK 140 mg/mL PnIj 140 mg.  11    aspirin (ECOTRIN) 81 MG EC tablet Take 1 tablet (81 mg total) by mouth once daily.  0     No current facility-administered medications on file prior to visit.       No cardiac monitor results found for the past 12 months    Results for orders placed during the hospital encounter of 08/07/23    Echo    Interpretation Summary    Left Ventricle: The left  ventricle is normal in size. Normal wall thickness. There is concentric remodeling. Normal wall motion. There is normal systolic function. Ejection fraction by visual approximation is 65%. Grade I diastolic dysfunction.    Right Ventricle: Normal right ventricular cavity size. Wall thickness is normal. Right ventricle wall motion  is normal. Systolic function is normal.    Aortic Valve: There is mild stenosis. Aortic valve area by VTI is 1.72 cm². Aortic valve peak velocity is 1.61 m/s. Mean gradient is 5 mmHg. The dimensionless index is 0.62. There is no significant regurgitation.    Mitral Valve: There is no stenosis. There is no significant regurgitation.    Tricuspid Valve: There is mild regurgitation.    Pulmonary Artery: The estimated pulmonary artery systolic pressure is 23 mmHg.    IVC/SVC: Normal venous pressure at 3 mmHg.    No results found for this or any previous visit.        Results for orders placed or performed during the hospital encounter of 07/11/23   SCHEDULED EKG 12-LEAD (to Muse)    Collection Time: 07/11/23  3:44 PM    Narrative    Test Reason : E11.59,I15.2,    Vent. Rate : 063 BPM     Atrial Rate : 063 BPM     P-R Int : 180 ms          QRS Dur : 074 ms      QT Int : 400 ms       P-R-T Axes : 047 060 032 degrees     QTc Int : 409 ms    Normal sinus rhythm  Low voltage QRS  Baseline Artifact  Abnormal ECG  No previous ECGs available  Confirmed by SHEILA JASSO MD ([id number]) on 7/11/2023 4:36:04 PM    Referred By: WOOD BAKER           Confirmed By:SHEILA JASSO MD         Review of Systems   Constitutional: Positive for malaise/fatigue.   HENT:  Negative for hearing loss and hoarse voice.    Eyes:  Negative for blurred vision and visual disturbance.   Cardiovascular:  Negative for chest pain, claudication, dyspnea on exertion, irregular heartbeat, leg swelling, near-syncope, orthopnea, palpitations, paroxysmal nocturnal dyspnea and syncope.   Respiratory:  Negative for cough, hemoptysis,  shortness of breath, sleep disturbances due to breathing, snoring and wheezing.    Endocrine: Negative for cold intolerance and heat intolerance.   Hematologic/Lymphatic: Does not bruise/bleed easily.   Skin:  Negative for color change, dry skin and nail changes.   Musculoskeletal:  Positive for arthritis and back pain. Negative for joint pain and myalgias.   Gastrointestinal:  Negative for bloating, abdominal pain, constipation, nausea and vomiting.   Genitourinary:  Negative for dysuria, flank pain, hematuria and hesitancy.   Neurological:  Negative for headaches, light-headedness, loss of balance, numbness, paresthesias and weakness.   Psychiatric/Behavioral:  Negative for altered mental status.    Allergic/Immunologic: Negative for environmental allergies.         Objective:BP (!) 140/78 (BP Location: Left arm, Patient Position: Sitting)   Pulse 77   Wt 88.3 kg (194 lb 10.7 oz)   SpO2 96%   BMI 34.48 kg/m²      Physical Exam  Vitals and nursing note reviewed.   Constitutional:       General: She is not in acute distress.     Appearance: Normal appearance. She is well-developed. She is obese. She is not ill-appearing.   HENT:      Head: Normocephalic and atraumatic.      Nose: Nose normal.      Mouth/Throat:      Mouth: Mucous membranes are moist.   Eyes:      Pupils: Pupils are equal, round, and reactive to light.   Neck:      Thyroid: No thyromegaly.      Vascular: No JVD.      Trachea: No tracheal deviation.   Cardiovascular:      Rate and Rhythm: Normal rate and regular rhythm.      Chest Wall: PMI is not displaced.      Pulses: Intact distal pulses.           Radial pulses are 2+ on the right side and 2+ on the left side.        Dorsalis pedis pulses are 2+ on the right side and 2+ on the left side.      Heart sounds: S1 normal and S2 normal. Heart sounds not distant. No murmur heard.  Pulmonary:      Effort: Pulmonary effort is normal. No respiratory distress.      Breath sounds: Normal breath sounds.  "No wheezing.   Abdominal:      General: Bowel sounds are normal. There is no distension.      Palpations: Abdomen is soft.      Tenderness: There is no abdominal tenderness.   Musculoskeletal:         General: No swelling. Normal range of motion.      Cervical back: Full passive range of motion without pain, normal range of motion and neck supple.      Right lower leg: No edema.      Left lower leg: No edema.      Right ankle: No swelling.      Left ankle: No swelling.   Skin:     General: Skin is warm and dry.      Capillary Refill: Capillary refill takes less than 2 seconds.      Nails: There is no clubbing.   Neurological:      General: No focal deficit present.      Mental Status: She is alert and oriented to person, place, and time.      Motor: No weakness.   Psychiatric:         Speech: Speech normal.         Behavior: Behavior normal.         Thought Content: Thought content normal.         Judgment: Judgment normal.         Lab Results   Component Value Date    CHOL 171 02/21/2024    CHOL 192 02/28/2023    CHOL 175 02/25/2022     Lab Results   Component Value Date    HDL 53 02/21/2024    HDL 49 02/28/2023    HDL 45 02/25/2022     Lab Results   Component Value Date    LDLCALC 79 02/21/2024    LDLCALC 98 02/28/2023    LDLCALC 84 02/25/2022     Lab Results   Component Value Date    TRIG 240 (H) 02/21/2024    TRIG 269 (H) 02/28/2023    TRIG 277 (H) 02/25/2022     No results found for: "CHOLHDL"    Chemistry        Component Value Date/Time     02/21/2024 1255    K 4.4 02/21/2024 1255    CL 96 02/21/2024 1255    CO2 25 02/21/2024 1255    BUN 15 02/21/2024 1255    CREATININE 0.64 02/21/2024 1255    GLU 90 02/21/2024 1255        Component Value Date/Time    CALCIUM 10.4 (H) 02/21/2024 1255    ALKPHOS 95 06/22/2020 0937    AST 28 02/21/2024 1255    ALT 17 02/21/2024 1255    BILITOT 0.3 02/21/2024 1255    EGFRNONAA 82 02/25/2022 1001          Lab Results   Component Value Date    TSH 1.800 02/21/2024     No " "results found for: "INR", "PROTIME"  Lab Results   Component Value Date    WBC 9.9 02/21/2024    HGB 14.2 02/21/2024    HCT 43.1 02/21/2024    MCV 90 02/21/2024     02/21/2024          Assessment:      1. Hypertension associated with diabetes    2. Hyperlipidemia associated with type 2 diabetes mellitus    3. Nonrheumatic aortic valve stenosis    4. Diastolic dysfunction    5. Type 2 diabetes mellitus with diabetic polyneuropathy, without long-term current use of insulin    6. Class 1 obesity due to excess calories with serious comorbidity and body mass index (BMI) of 34.0 to 34.9 in adult    7. Hypothyroidism, unspecified type    8. Tobacco abuse    9. Allergy to statin medication        Plan:     Refill for Repatha sent today.    Dash diet 2 gm sodium restriction  Profile BP at home    Encourage daily walking as tolerated  Continue same CV medications    RTC in 6 months or sooner if needed.    EDUARD Nugent  [facility]    "

## 2024-04-24 ENCOUNTER — TELEPHONE (OUTPATIENT)
Dept: CARDIOLOGY | Facility: CLINIC | Age: 84
End: 2024-04-24
Payer: COMMERCIAL

## 2024-04-24 NOTE — TELEPHONE ENCOUNTER
Submitted the pa for repatha to plan (human) and this is the response I received: Eligibility could not be verified for this patient - patient not found. Please review patient information and re-submit.       I will have to call [employer] and get pa approved over the phone pb

## 2024-10-03 DIAGNOSIS — R94.31 ABNORMAL ECG: Primary | ICD-10-CM

## 2024-10-03 DIAGNOSIS — I51.89 DIASTOLIC DYSFUNCTION: ICD-10-CM

## 2025-04-30 DIAGNOSIS — E78.5 HYPERLIPIDEMIA ASSOCIATED WITH TYPE 2 DIABETES MELLITUS: ICD-10-CM

## 2025-04-30 DIAGNOSIS — Z88.8 ALLERGY TO STATIN MEDICATION: ICD-10-CM

## 2025-04-30 DIAGNOSIS — E11.69 HYPERLIPIDEMIA ASSOCIATED WITH TYPE 2 DIABETES MELLITUS: ICD-10-CM

## 2025-04-30 RX ORDER — EVOLOCUMAB 140 MG/ML
140 INJECTION, SOLUTION SUBCUTANEOUS
Qty: 2 ML | Refills: 11 | Status: SHIPPED | OUTPATIENT
Start: 2025-04-30

## 2025-05-06 ENCOUNTER — TELEPHONE (OUTPATIENT)
Dept: OPHTHALMOLOGY | Facility: CLINIC | Age: 85
End: 2025-05-06
Payer: MEDICARE

## 2025-05-06 NOTE — TELEPHONE ENCOUNTER
Copied from CRM #[id number]. Topic: Appointments - Appointment Rescheduling  >> May 6, 2025  2:48 PM Mabel wrote:  Type:  Needs Medical Advice    Who Called: pt  Symptoms (please be specific): na   How long has patient had these symptoms:  na  Pharmacy name and phone #:  na  Would the patient rather a call back or a response via [facility]? call  Best Call Back Number: [phone]  Additional Information: requesting to reschedule preferably for next Monday may12 or Tuesday May13 around 130 due to transportation

## 2025-05-14 PROBLEM — T46.6X5A MYALGIA DUE TO STATIN: Status: ACTIVE | Noted: 2025-05-14

## 2025-05-14 PROBLEM — M79.10 MYALGIA DUE TO STATIN: Status: ACTIVE | Noted: 2025-05-14

## 2025-07-03 ENCOUNTER — TELEPHONE (OUTPATIENT)
Dept: OPHTHALMOLOGY | Facility: CLINIC | Age: 85
End: 2025-07-03
Payer: MEDICARE

## 2025-07-03 NOTE — TELEPHONE ENCOUNTER
Copied from CRM #[id number]. Topic: Appointments - Appointment Access  >> Jul 3, 2025  1:38 PM Sherlyn wrote:  ..Type:  Sooner Apoointment Request    Caller is requesting a sooner appointment.  Caller declined first available appointment listed below.  Caller will not accept being placed on the waitlist and is requesting a message be sent to doctor.  Name of Caller:.Mandie GABRIELLE Wu  When is the first available appointment?  Symptoms:  Would the patient rather a call back or a response via [facility]? Call back   Best Call Back Number:.[phone]  Additional Information: pt is asking for an return call in reference to needing an 1:30 apt with Dr. Dixon.

## 2025-07-14 ENCOUNTER — TELEPHONE (OUTPATIENT)
Dept: PHARMACY | Facility: CLINIC | Age: 85
End: 2025-07-14
Payer: MEDICARE

## 2025-07-14 NOTE — TELEPHONE ENCOUNTER
[facility]/Population Health Chart Review & Patient Outreach Details For Medication Adherence Project    Reason for Outreach Encounter: 3rd Party payor non-compliance report ([employer], [employer], [employer], etc)  2.  Patient Outreach Method: Reviewed patient chart   3.   Medication in question:    Hypertension Medications              amLODIPine (NORVASC) 5 MG tablet Take 1 tablet (5 mg total) by mouth once daily.    furosemide (LASIX) 40 MG tablet Take 1 tablet (40 mg total) by mouth daily as needed (swelling).    losartan-hydrochlorothiazide 100-25 mg (HYZAAR) 100-25 mg per tablet Take 1 tablet by mouth once daily.    metoprolol succinate (TOPROL XL) 100 MG 24 hr tablet Take 1 tablet (100 mg total) by mouth once daily.                 LF 90 ds 6/16/25    4.  Reviewed and or Updates Made To: Patient Chart  5. Outreach Outcomes and/or actions taken: Patient filled medication and is on track to be adherent  Additional Notes: